# Patient Record
Sex: MALE | Race: OTHER | NOT HISPANIC OR LATINO | URBAN - METROPOLITAN AREA
[De-identification: names, ages, dates, MRNs, and addresses within clinical notes are randomized per-mention and may not be internally consistent; named-entity substitution may affect disease eponyms.]

---

## 2017-01-04 ENCOUNTER — EMERGENCY (EMERGENCY)
Facility: HOSPITAL | Age: 37
LOS: 1 days | Discharge: HOME | End: 2017-01-04
Attending: EMERGENCY MEDICINE | Admitting: EMERGENCY MEDICINE
Payer: COMMERCIAL

## 2017-01-04 VITALS
OXYGEN SATURATION: 100 % | TEMPERATURE: 98 F | HEART RATE: 65 BPM | DIASTOLIC BLOOD PRESSURE: 72 MMHG | SYSTOLIC BLOOD PRESSURE: 116 MMHG | RESPIRATION RATE: 18 BRPM

## 2017-01-04 PROCEDURE — 73610 X-RAY EXAM OF ANKLE: CPT | Mod: 26,RT

## 2017-01-04 PROCEDURE — 99284 EMERGENCY DEPT VISIT MOD MDM: CPT | Mod: 25

## 2017-01-04 PROCEDURE — 70450 CT HEAD/BRAIN W/O DYE: CPT | Mod: 26

## 2017-01-04 PROCEDURE — 72125 CT NECK SPINE W/O DYE: CPT | Mod: 26

## 2017-01-04 PROCEDURE — 73590 X-RAY EXAM OF LOWER LEG: CPT | Mod: 26,RT

## 2017-01-04 PROCEDURE — 93010 ELECTROCARDIOGRAM REPORT: CPT

## 2017-01-04 NOTE — ED PROVIDER NOTE - LOWER EXTREMITY EXAM, RIGHT
TTP underlying bone over abrasion area, no palpable or visible deformity. no grossly deformed. TTP underlying bone over abrasion area, no palpable or visible deformity. not grossly deformed.
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
## 2017-01-04 NOTE — ED PROVIDER NOTE - NS ED MD SCRIBE ATTENDING SCRIBE SECTIONS
PAST MEDICAL/SURGICAL/SOCIAL HISTORY/VITAL SIGNS( Pullset)/HISTORY OF PRESENT ILLNESS/PHYSICAL EXAM/HIV/REVIEW OF SYSTEMS/DISPOSITION

## 2017-01-04 NOTE — ED PROVIDER NOTE - PROGRESS NOTE DETAILS
DANYELL Fraga: pt is well appearing ambulating without difficulty.  X-rays and CTs negative pt ok for discharge per Dr Gillespie.  Pt reports hx of back pain being treated by PMD and states he is scheduled to start PT.  Discharge reviewed and discussed with patient.
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
## 2017-01-04 NOTE — ED PROVIDER NOTE - CONSTITUTIONAL, MLM
normal... Well appearing, well nourished, awake, alert, oriented to person, place, time/situation and in no apparent distress. Odor of cannabis.
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
## 2017-01-04 NOTE — ED PROVIDER NOTE - MUSCULOSKELETAL MINIMAL EXAM
mild TTP cervical midline and paraspinal region. mild TTP cervical midline and paraspinal region, no step-offs
